# Patient Record
Sex: MALE | Race: WHITE | NOT HISPANIC OR LATINO | URBAN - METROPOLITAN AREA
[De-identification: names, ages, dates, MRNs, and addresses within clinical notes are randomized per-mention and may not be internally consistent; named-entity substitution may affect disease eponyms.]

---

## 2021-12-02 ENCOUNTER — OFFICE VISIT (OUTPATIENT)
Dept: URBAN - METROPOLITAN AREA TELEHEALTH 2 | Facility: TELEHEALTH | Age: 51
End: 2021-12-02
Payer: COMMERCIAL

## 2021-12-02 DIAGNOSIS — Z80.0 FAMILY HISTORY OF COLON CANCER: ICD-10-CM

## 2021-12-02 PROBLEM — 53741008: Status: ACTIVE | Noted: 2021-12-02

## 2021-12-02 PROCEDURE — 99202 OFFICE O/P NEW SF 15 MIN: CPT | Performed by: INTERNAL MEDICINE

## 2021-12-02 NOTE — HPI-TODAY'S VISIT:
This is a 51 old  male presents today for discussion regarding colon cancer screening.  He is currently in his usual state of health.  He has no particular gastrointestinal complaints.  Of note he has a family history of colon cancer.  Has not previously had a colonoscopy.  He did have coronary stents placed the beginning of the year he has been recently having some issues and is scheduled for repeat heart catheterization tomorrow.

## 2023-06-14 ENCOUNTER — DASHBOARD ENCOUNTERS (OUTPATIENT)
Age: 53
End: 2023-06-14

## 2023-06-14 ENCOUNTER — LAB OUTSIDE AN ENCOUNTER (OUTPATIENT)
Dept: URBAN - METROPOLITAN AREA CLINIC 92 | Facility: CLINIC | Age: 53
End: 2023-06-14

## 2023-06-14 ENCOUNTER — OFFICE VISIT (OUTPATIENT)
Dept: URBAN - METROPOLITAN AREA CLINIC 92 | Facility: CLINIC | Age: 53
End: 2023-06-14
Payer: COMMERCIAL

## 2023-06-14 VITALS
HEART RATE: 63 BPM | TEMPERATURE: 97.9 F | DIASTOLIC BLOOD PRESSURE: 72 MMHG | BODY MASS INDEX: 23.3 KG/M2 | SYSTOLIC BLOOD PRESSURE: 111 MMHG | HEIGHT: 66 IN | WEIGHT: 145 LBS

## 2023-06-14 DIAGNOSIS — B18.2 CHRONIC HEPATITIS C WITHOUT HEPATIC COMA: ICD-10-CM

## 2023-06-14 PROCEDURE — 99204 OFFICE O/P NEW MOD 45 MIN: CPT | Performed by: INTERNAL MEDICINE

## 2023-06-14 NOTE — HPI-TODAY'S VISIT:
53yM with a hx of non-Hodgkins lymphoma, HCV s/p treatment, CAD on Plavix, who presents as f/u for HCV and fam hx of CRC.  Dx with HCV over 30 years ago and underwent treatment. He received HCV treatment. HCV was 2/2 blood transfusion. He was told that he was virus free but then when he travels he said he has detectable virus sometimes. He was told he has cirrhosis, and had biopsies in the past. His last RUQ US was in 2011 and shows a normal liver.   Mom dx with CRC in her 70s. Had a colonoscopy 9/2022 with polyps.

## 2023-06-20 ENCOUNTER — WEB ENCOUNTER (OUTPATIENT)
Dept: URBAN - METROPOLITAN AREA CLINIC 92 | Facility: CLINIC | Age: 53
End: 2023-06-20

## 2023-06-21 ENCOUNTER — WEB ENCOUNTER (OUTPATIENT)
Dept: URBAN - METROPOLITAN AREA CLINIC 92 | Facility: CLINIC | Age: 53
End: 2023-06-21

## 2023-06-27 LAB
A/G RATIO: 2
ABSOLUTE BASOPHILS: 0
ABSOLUTE EOSINOPHILS: 98
ABSOLUTE LYMPHOCYTES: 1470
ABSOLUTE MONOCYTES: 294
ABSOLUTE NEUTROPHILS: 3038
ALBUMIN: 5
ALKALINE PHOSPHATASE: 73
ALT (SGPT): 28
ALT (SGPT): 28
AST (SGOT): 25
AST (SGOT): 25
BASOPHILS: 0
BILIRUBIN, TOTAL: 1.1
BUN/CREATININE RATIO: (no result)
BUN: 18
CALCIUM: 9.5
CARBON DIOXIDE, TOTAL: 28
CHLORIDE: 106
COMMENT(S): (no result)
CREATININE: 0.99
EGFR: 92
ENHANCED LIVER FIBROSIS (ELF) SCORE: 8.4
EOSINOPHILS: 2
FIB 4 INDEX: 2.04
FIB 4 INTERPRETATION: (no result)
GLOBULIN, TOTAL: 2.5
GLUCOSE: 105
HCV RNA, QUANTITATIVE: <1.18
HCV RNA, QUANTITATIVE: <15
HEMATOCRIT: 45.3
HEMOGLOBIN: 15.6
HEPATITIS C ANTIBODY: REACTIVE
INDEX: >11
INR: 1.1
LYMPHOCYTES: 30
MCH: 32
MCHC: 34.4
MCV: 93
MONOCYTES: 6
MPV: 10.8
NEUTROPHILS: 62
NOTE: (no result)
PLATELET COUNT: 118
PLATELETS: 118
POTASSIUM: 4.4
PROTEIN, TOTAL: 7.5
PT: 11.4
RDW: 13.4
RED BLOOD CELL COUNT: 4.87
SODIUM: 141
WHITE BLOOD CELL COUNT: 4.9

## 2023-07-11 ENCOUNTER — OFFICE VISIT (OUTPATIENT)
Dept: URBAN - METROPOLITAN AREA CLINIC 91 | Facility: CLINIC | Age: 53
End: 2023-07-11

## 2023-07-13 ENCOUNTER — WEB ENCOUNTER (OUTPATIENT)
Dept: URBAN - METROPOLITAN AREA CLINIC 92 | Facility: CLINIC | Age: 53
End: 2023-07-13

## 2023-07-17 ENCOUNTER — WEB ENCOUNTER (OUTPATIENT)
Dept: URBAN - METROPOLITAN AREA CLINIC 92 | Facility: CLINIC | Age: 53
End: 2023-07-17